# Patient Record
Sex: FEMALE | Race: WHITE
[De-identification: names, ages, dates, MRNs, and addresses within clinical notes are randomized per-mention and may not be internally consistent; named-entity substitution may affect disease eponyms.]

---

## 2017-04-26 ENCOUNTER — HOSPITAL ENCOUNTER (OUTPATIENT)
Dept: HOSPITAL 84 - D.MAMMO | Age: 72
Discharge: HOME | End: 2017-04-26
Attending: FAMILY MEDICINE
Payer: MEDICARE

## 2017-04-26 VITALS — BODY MASS INDEX: 34.1 KG/M2

## 2017-04-26 DIAGNOSIS — Z12.31: Primary | ICD-10-CM

## 2017-07-30 ENCOUNTER — HOSPITAL ENCOUNTER (EMERGENCY)
Dept: HOSPITAL 84 - D.ER | Age: 72
Discharge: HOME | End: 2017-07-30
Payer: MEDICARE

## 2017-07-30 VITALS — BODY MASS INDEX: 34.1 KG/M2

## 2017-07-30 DIAGNOSIS — N39.0: Primary | ICD-10-CM

## 2017-07-30 DIAGNOSIS — I10: ICD-10-CM

## 2017-07-30 LAB
APPEARANCE UR: (no result)
BACTERIA #/AREA URNS HPF: (no result) /HPF
COLOR UR: (no result)
PH UR STRIP: 5 [PH] (ref 5–6)
RBC #/AREA URNS HPF: (no result) /HPF (ref 0–5)
SP GR UR STRIP: 1.02 (ref 1–1.02)
SQUAMOUS #/AREA URNS HPF: (no result) /HPF (ref 0–5)

## 2017-08-18 ENCOUNTER — HOSPITAL ENCOUNTER (OUTPATIENT)
Dept: HOSPITAL 84 - D.US | Age: 72
Discharge: HOME | End: 2017-08-18
Attending: FAMILY MEDICINE
Payer: MEDICARE

## 2017-08-18 VITALS — BODY MASS INDEX: 34.1 KG/M2

## 2017-08-18 DIAGNOSIS — E04.1: Primary | ICD-10-CM

## 2017-10-03 ENCOUNTER — HOSPITAL ENCOUNTER (OUTPATIENT)
Dept: HOSPITAL 84 - D.LAB | Age: 72
End: 2017-10-03
Attending: NEUROLOGICAL SURGERY
Payer: MEDICARE

## 2017-10-03 VITALS — BODY MASS INDEX: 34.1 KG/M2

## 2017-10-03 DIAGNOSIS — Z01.812: Primary | ICD-10-CM

## 2017-10-03 LAB
BUN SERPL-MCNC: 21 MG/DL (ref 7–18)
CREAT SERPL-MCNC: 0.7 MG/DL (ref 0.6–1.3)

## 2017-10-24 ENCOUNTER — HOSPITAL ENCOUNTER (OUTPATIENT)
Dept: HOSPITAL 84 - D.MAMMO | Age: 72
Discharge: HOME | End: 2017-10-24
Attending: FAMILY MEDICINE
Payer: MEDICARE

## 2017-10-24 VITALS — BODY MASS INDEX: 34.1 KG/M2

## 2017-10-24 DIAGNOSIS — N60.02: Primary | ICD-10-CM

## 2017-10-26 ENCOUNTER — HOSPITAL ENCOUNTER (OUTPATIENT)
Dept: HOSPITAL 84 - D.LABREF | Age: 72
Discharge: HOME | End: 2017-10-26
Attending: ORTHOPAEDIC SURGERY
Payer: MEDICARE

## 2017-10-26 VITALS — BODY MASS INDEX: 34.1 KG/M2

## 2017-10-26 DIAGNOSIS — M17.0: Primary | ICD-10-CM

## 2017-10-26 LAB
BASOPHILS NFR BLD AUTO: 0.5 % (ref 0–2)
EOSINOPHIL NFR BLD: 1.9 % (ref 0–7)
ERYTHROCYTE [DISTWIDTH] IN BLOOD BY AUTOMATED COUNT: 14.9 % (ref 11.5–14.5)
ERYTHROCYTE [SEDIMENTATION RATE] IN BLOOD: 12 MM/HR (ref 0–30)
HCT VFR BLD CALC: 37.8 % (ref 36–48)
HGB BLD-MCNC: 11.9 G/DL (ref 12–16)
IMM GRANULOCYTES NFR BLD: 0.2 % (ref 0–5)
LYMPHOCYTES NFR BLD AUTO: 19.7 % (ref 15–50)
MCH RBC QN AUTO: 26.7 PG (ref 26–34)
MCHC RBC AUTO-ENTMCNC: 31.5 G/DL (ref 31–37)
MCV RBC: 84.9 FL (ref 80–100)
MONOCYTES NFR BLD: 7.2 % (ref 2–11)
NEUTROPHILS NFR BLD AUTO: 70.5 % (ref 40–80)
PLATELET # BLD: 196 10X3/UL (ref 130–400)
PMV BLD AUTO: 11.9 FL (ref 7.4–10.4)
RBC # BLD AUTO: 4.45 10X6/UL (ref 4–5.4)
WBC # BLD AUTO: 5.9 10X3/UL (ref 4.8–10.8)

## 2018-01-19 ENCOUNTER — HOSPITAL ENCOUNTER (OUTPATIENT)
Dept: HOSPITAL 84 - D.US | Age: 73
Discharge: HOME | End: 2018-01-19
Attending: FAMILY MEDICINE
Payer: MEDICARE

## 2018-01-19 VITALS — BODY MASS INDEX: 34.1 KG/M2

## 2018-01-19 DIAGNOSIS — R22.1: Primary | ICD-10-CM

## 2018-04-06 ENCOUNTER — HOSPITAL ENCOUNTER (OUTPATIENT)
Dept: HOSPITAL 84 - D.MRI | Age: 73
Discharge: HOME | End: 2018-04-06
Attending: FAMILY MEDICINE
Payer: MEDICARE

## 2018-04-06 VITALS — BODY MASS INDEX: 34.1 KG/M2
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
## 2018-04-06 DIAGNOSIS — S12.401A: Primary | ICD-10-CM
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
## 2018-05-13 ENCOUNTER — HOSPITAL ENCOUNTER (EMERGENCY)
Dept: HOSPITAL 84 - D.ER | Age: 73
Discharge: HOME | End: 2018-05-13
Payer: MEDICARE

## 2018-05-13 VITALS — BODY MASS INDEX: 34.1 KG/M2

## 2018-05-13 DIAGNOSIS — N39.0: Primary | ICD-10-CM

## 2018-05-13 LAB
APPEARANCE UR: (no result)
BACTERIA #/AREA URNS HPF: (no result) /HPF
BASOPHILS NFR BLD AUTO: 0.3 % (ref 0–2)
BILIRUB SERPL-MCNC: NEGATIVE MG/DL
COLOR UR: YELLOW
EOSINOPHIL NFR BLD: 2.9 % (ref 0–7)
ERYTHROCYTE [DISTWIDTH] IN BLOOD BY AUTOMATED COUNT: 15.2 % (ref 11.5–14.5)
GLUCOSE SERPL-MCNC: NEGATIVE MG/DL
HCT VFR BLD CALC: 30.9 % (ref 36–48)
HGB BLD-MCNC: 9.6 G/DL (ref 12–16)
IMM GRANULOCYTES NFR BLD: 0.3 % (ref 0–5)
KETONES UR STRIP-MCNC: NEGATIVE MG/DL
LYMPHOCYTES NFR BLD AUTO: 16.2 % (ref 15–50)
MCH RBC QN AUTO: 26.3 PG (ref 26–34)
MCHC RBC AUTO-ENTMCNC: 31.1 G/DL (ref 31–37)
MCV RBC: 84.7 FL (ref 80–100)
MONOCYTES NFR BLD: 6.2 % (ref 2–11)
NEUTROPHILS NFR BLD AUTO: 74.1 % (ref 40–80)
NITRITE UR-MCNC: NEGATIVE MG/ML
PH UR STRIP: 5 [PH] (ref 5–6)
PLATELET # BLD: 266 10X3/UL (ref 130–400)
PMV BLD AUTO: 10 FL (ref 7.4–10.4)
PROT UR-MCNC: (no result) MG/DL
RBC # BLD AUTO: 3.65 10X6/UL (ref 4–5.4)
RBC #/AREA URNS HPF: (no result) /HPF (ref 0–5)
SP GR UR STRIP: 1.02 (ref 1–1.02)
SQUAMOUS #/AREA URNS HPF: (no result) /HPF (ref 0–5)
UROBILINOGEN UR-MCNC: NORMAL MG/DL
WBC # BLD AUTO: 7.3 10X3/UL (ref 4.8–10.8)
WBC #/AREA URNS HPF: >50 /HPF (ref 0–5)

## 2018-10-03 ENCOUNTER — HOSPITAL ENCOUNTER (OUTPATIENT)
Dept: HOSPITAL 84 - D.US | Age: 73
Discharge: HOME | End: 2018-10-03
Attending: FAMILY MEDICINE
Payer: MEDICARE

## 2018-10-03 VITALS — BODY MASS INDEX: 34.1 KG/M2

## 2018-10-03 DIAGNOSIS — E04.1: Primary | ICD-10-CM

## 2018-11-01 ENCOUNTER — HOSPITAL ENCOUNTER (INPATIENT)
Dept: HOSPITAL 84 - D.MS | Age: 73
LOS: 3 days | Discharge: HOME | DRG: 192 | End: 2018-11-04
Attending: FAMILY MEDICINE | Admitting: FAMILY MEDICINE
Payer: MEDICARE

## 2018-11-01 VITALS — HEIGHT: 60 IN | WEIGHT: 183.38 LBS | BODY MASS INDEX: 36 KG/M2 | BODY MASS INDEX: 36 KG/M2

## 2018-11-01 VITALS — SYSTOLIC BLOOD PRESSURE: 102 MMHG | DIASTOLIC BLOOD PRESSURE: 71 MMHG

## 2018-11-01 VITALS — DIASTOLIC BLOOD PRESSURE: 98 MMHG | SYSTOLIC BLOOD PRESSURE: 153 MMHG

## 2018-11-01 DIAGNOSIS — J44.1: Primary | ICD-10-CM

## 2018-11-01 DIAGNOSIS — R53.1: ICD-10-CM

## 2018-11-01 DIAGNOSIS — I10: ICD-10-CM

## 2018-11-01 LAB
ALBUMIN SERPL-MCNC: 3.6 G/DL (ref 3.4–5)
ALP SERPL-CCNC: 59 U/L (ref 46–116)
ALT SERPL-CCNC: 19 U/L (ref 10–68)
ANION GAP SERPL CALC-SCNC: 14.7 MMOL/L (ref 8–16)
APPEARANCE UR: CLEAR
BACTERIA #/AREA URNS HPF: (no result) /HPF
BASOPHILS NFR BLD AUTO: 0.2 % (ref 0–2)
BILIRUB SERPL-MCNC: 0.19 MG/DL (ref 0.2–1.3)
BILIRUB SERPL-MCNC: NEGATIVE MG/DL
BUN SERPL-MCNC: 28 MG/DL (ref 7–18)
CALCIUM SERPL-MCNC: 9.2 MG/DL (ref 8.5–10.1)
CHLORIDE SERPL-SCNC: 104 MMOL/L (ref 98–107)
CO2 SERPL-SCNC: 27.8 MMOL/L (ref 21–32)
COLOR UR: YELLOW
CREAT SERPL-MCNC: 0.9 MG/DL (ref 0.6–1.3)
EOSINOPHIL NFR BLD: 0.2 % (ref 0–7)
ERYTHROCYTE [DISTWIDTH] IN BLOOD BY AUTOMATED COUNT: 14.5 % (ref 11.5–14.5)
GLOBULIN SER-MCNC: 3.8 G/L
GLUCOSE SERPL-MCNC: 115 MG/DL (ref 74–106)
GLUCOSE SERPL-MCNC: NEGATIVE MG/DL
HCT VFR BLD CALC: 36.2 % (ref 36–48)
HGB BLD-MCNC: 11.6 G/DL (ref 12–16)
IMM GRANULOCYTES NFR BLD: 0.2 % (ref 0–5)
KETONES UR STRIP-MCNC: NEGATIVE MG/DL
LYMPHOCYTES NFR BLD AUTO: 36.5 % (ref 15–50)
MCH RBC QN AUTO: 26.5 PG (ref 26–34)
MCHC RBC AUTO-ENTMCNC: 32 G/DL (ref 31–37)
MCV RBC: 82.6 FL (ref 80–100)
MONOCYTES NFR BLD: 7.6 % (ref 2–11)
NEUTROPHILS NFR BLD AUTO: 55.3 % (ref 40–80)
NITRITE UR-MCNC: NEGATIVE MG/ML
OSMOLALITY SERPL CALC.SUM OF ELEC: 291 MOSM/KG (ref 275–300)
PH UR STRIP: 5 [PH] (ref 5–6)
PLATELET # BLD: 173 10X3/UL (ref 130–400)
PMV BLD AUTO: 10.8 FL (ref 7.4–10.4)
POTASSIUM SERPL-SCNC: 3.5 MMOL/L (ref 3.5–5.1)
PROT SERPL-MCNC: 7.4 G/DL (ref 6.4–8.2)
PROT UR-MCNC: (no result) MG/DL
RBC # BLD AUTO: 4.38 10X6/UL (ref 4–5.4)
RBC #/AREA URNS HPF: (no result) /HPF (ref 0–5)
SODIUM SERPL-SCNC: 143 MMOL/L (ref 136–145)
SP GR UR STRIP: 1.02 (ref 1–1.02)
SQUAMOUS #/AREA URNS HPF: (no result) /HPF (ref 0–5)
UROBILINOGEN UR-MCNC: NORMAL MG/DL
WBC # BLD AUTO: 6.5 10X3/UL (ref 4.8–10.8)
WBC #/AREA URNS HPF: (no result) /HPF (ref 0–5)

## 2018-11-02 VITALS — SYSTOLIC BLOOD PRESSURE: 134 MMHG | DIASTOLIC BLOOD PRESSURE: 95 MMHG

## 2018-11-02 VITALS — DIASTOLIC BLOOD PRESSURE: 69 MMHG | SYSTOLIC BLOOD PRESSURE: 185 MMHG

## 2018-11-02 VITALS — DIASTOLIC BLOOD PRESSURE: 83 MMHG | SYSTOLIC BLOOD PRESSURE: 176 MMHG

## 2018-11-02 VITALS — DIASTOLIC BLOOD PRESSURE: 72 MMHG | SYSTOLIC BLOOD PRESSURE: 147 MMHG

## 2018-11-02 VITALS — DIASTOLIC BLOOD PRESSURE: 51 MMHG | SYSTOLIC BLOOD PRESSURE: 118 MMHG

## 2018-11-02 LAB
ALBUMIN SERPL-MCNC: 3 G/DL (ref 3.4–5)
ALP SERPL-CCNC: 50 U/L (ref 46–116)
ALT SERPL-CCNC: 16 U/L (ref 10–68)
ANION GAP SERPL CALC-SCNC: 11 MMOL/L (ref 8–16)
BASOPHILS NFR BLD AUTO: 0.4 % (ref 0–2)
BILIRUB SERPL-MCNC: 0.08 MG/DL (ref 0.2–1.3)
BUN SERPL-MCNC: 23 MG/DL (ref 7–18)
CALCIUM SERPL-MCNC: 8.8 MG/DL (ref 8.5–10.1)
CHLORIDE SERPL-SCNC: 107 MMOL/L (ref 98–107)
CO2 SERPL-SCNC: 28.8 MMOL/L (ref 21–32)
CREAT SERPL-MCNC: 0.6 MG/DL (ref 0.6–1.3)
EOSINOPHIL NFR BLD: 0.6 % (ref 0–7)
ERYTHROCYTE [DISTWIDTH] IN BLOOD BY AUTOMATED COUNT: 14.4 % (ref 11.5–14.5)
GLOBULIN SER-MCNC: 3.2 G/L
GLUCOSE SERPL-MCNC: 113 MG/DL (ref 74–106)
HCT VFR BLD CALC: 32.3 % (ref 36–48)
HGB BLD-MCNC: 10.1 G/DL (ref 12–16)
IMM GRANULOCYTES NFR BLD: 0 % (ref 0–5)
LYMPHOCYTES NFR BLD AUTO: 36.3 % (ref 15–50)
MCH RBC QN AUTO: 25.9 PG (ref 26–34)
MCHC RBC AUTO-ENTMCNC: 31.3 G/DL (ref 31–37)
MCV RBC: 82.8 FL (ref 80–100)
MONOCYTES NFR BLD: 9.8 % (ref 2–11)
NEUTROPHILS NFR BLD AUTO: 52.9 % (ref 40–80)
OSMOLALITY SERPL CALC.SUM OF ELEC: 289 MOSM/KG (ref 275–300)
PLATELET # BLD: 173 10X3/UL (ref 130–400)
PMV BLD AUTO: 11.4 FL (ref 7.4–10.4)
POTASSIUM SERPL-SCNC: 3.8 MMOL/L (ref 3.5–5.1)
PROT SERPL-MCNC: 6.2 G/DL (ref 6.4–8.2)
RBC # BLD AUTO: 3.9 10X6/UL (ref 4–5.4)
SODIUM SERPL-SCNC: 143 MMOL/L (ref 136–145)
WBC # BLD AUTO: 4.9 10X3/UL (ref 4.8–10.8)

## 2018-11-03 VITALS — SYSTOLIC BLOOD PRESSURE: 174 MMHG | DIASTOLIC BLOOD PRESSURE: 71 MMHG

## 2018-11-03 VITALS — DIASTOLIC BLOOD PRESSURE: 78 MMHG | SYSTOLIC BLOOD PRESSURE: 179 MMHG

## 2018-11-03 VITALS — DIASTOLIC BLOOD PRESSURE: 75 MMHG | SYSTOLIC BLOOD PRESSURE: 180 MMHG

## 2018-11-03 VITALS — DIASTOLIC BLOOD PRESSURE: 65 MMHG | SYSTOLIC BLOOD PRESSURE: 134 MMHG

## 2018-11-03 LAB
ALBUMIN SERPL-MCNC: 2.8 G/DL (ref 3.4–5)
ALP SERPL-CCNC: 45 U/L (ref 46–116)
ALT SERPL-CCNC: 15 U/L (ref 10–68)
ANION GAP SERPL CALC-SCNC: 11.6 MMOL/L (ref 8–16)
BASOPHILS NFR BLD AUTO: 0.2 % (ref 0–2)
BILIRUB SERPL-MCNC: 0.1 MG/DL (ref 0.2–1.3)
BUN SERPL-MCNC: 24 MG/DL (ref 7–18)
CALCIUM SERPL-MCNC: 8.6 MG/DL (ref 8.5–10.1)
CHLORIDE SERPL-SCNC: 110 MMOL/L (ref 98–107)
CO2 SERPL-SCNC: 27.6 MMOL/L (ref 21–32)
CREAT SERPL-MCNC: 0.6 MG/DL (ref 0.6–1.3)
EOSINOPHIL NFR BLD: 2.8 % (ref 0–7)
ERYTHROCYTE [DISTWIDTH] IN BLOOD BY AUTOMATED COUNT: 14.4 % (ref 11.5–14.5)
GLOBULIN SER-MCNC: 3 G/L
GLUCOSE SERPL-MCNC: 96 MG/DL (ref 74–106)
HCT VFR BLD CALC: 31.7 % (ref 36–48)
HGB BLD-MCNC: 10 G/DL (ref 12–16)
IMM GRANULOCYTES NFR BLD: 0 % (ref 0–5)
LYMPHOCYTES NFR BLD AUTO: 37 % (ref 15–50)
MCH RBC QN AUTO: 26 PG (ref 26–34)
MCHC RBC AUTO-ENTMCNC: 31.5 G/DL (ref 31–37)
MCV RBC: 82.6 FL (ref 80–100)
MONOCYTES NFR BLD: 8.5 % (ref 2–11)
NEUTROPHILS NFR BLD AUTO: 51.5 % (ref 40–80)
OSMOLALITY SERPL CALC.SUM OF ELEC: 292 MOSM/KG (ref 275–300)
PLATELET # BLD: 165 10X3/UL (ref 130–400)
PMV BLD AUTO: 11 FL (ref 7.4–10.4)
POTASSIUM SERPL-SCNC: 4.2 MMOL/L (ref 3.5–5.1)
PROT SERPL-MCNC: 5.8 G/DL (ref 6.4–8.2)
RBC # BLD AUTO: 3.84 10X6/UL (ref 4–5.4)
SODIUM SERPL-SCNC: 145 MMOL/L (ref 136–145)
WBC # BLD AUTO: 4.2 10X3/UL (ref 4.8–10.8)

## 2018-11-04 VITALS — SYSTOLIC BLOOD PRESSURE: 183 MMHG | DIASTOLIC BLOOD PRESSURE: 74 MMHG

## 2018-11-04 VITALS — DIASTOLIC BLOOD PRESSURE: 77 MMHG | SYSTOLIC BLOOD PRESSURE: 155 MMHG

## 2018-11-04 VITALS — SYSTOLIC BLOOD PRESSURE: 136 MMHG | DIASTOLIC BLOOD PRESSURE: 66 MMHG

## 2018-11-04 LAB
ALBUMIN SERPL-MCNC: 2.9 G/DL (ref 3.4–5)
ALP SERPL-CCNC: 52 U/L (ref 46–116)
ALT SERPL-CCNC: 15 U/L (ref 10–68)
ANION GAP SERPL CALC-SCNC: 13.1 MMOL/L (ref 8–16)
BASOPHILS NFR BLD AUTO: 0 % (ref 0–2)
BILIRUB SERPL-MCNC: 0.17 MG/DL (ref 0.2–1.3)
BUN SERPL-MCNC: 22 MG/DL (ref 7–18)
CALCIUM SERPL-MCNC: 9.3 MG/DL (ref 8.5–10.1)
CHLORIDE SERPL-SCNC: 105 MMOL/L (ref 98–107)
CO2 SERPL-SCNC: 28.2 MMOL/L (ref 21–32)
CREAT SERPL-MCNC: 0.6 MG/DL (ref 0.6–1.3)
EOSINOPHIL NFR BLD: 0 % (ref 0–7)
ERYTHROCYTE [DISTWIDTH] IN BLOOD BY AUTOMATED COUNT: 14.3 % (ref 11.5–14.5)
GLOBULIN SER-MCNC: 3.5 G/L
GLUCOSE SERPL-MCNC: 119 MG/DL (ref 74–106)
HCT VFR BLD CALC: 35 % (ref 36–48)
HGB BLD-MCNC: 11.1 G/DL (ref 12–16)
IMM GRANULOCYTES NFR BLD: 0.5 % (ref 0–5)
LYMPHOCYTES NFR BLD AUTO: 14.3 % (ref 15–50)
MCH RBC QN AUTO: 26.1 PG (ref 26–34)
MCHC RBC AUTO-ENTMCNC: 31.7 G/DL (ref 31–37)
MCV RBC: 82.2 FL (ref 80–100)
MONOCYTES NFR BLD: 4.8 % (ref 2–11)
NEUTROPHILS NFR BLD AUTO: 80.4 % (ref 40–80)
OSMOLALITY SERPL CALC.SUM OF ELEC: 286 MOSM/KG (ref 275–300)
PLATELET # BLD: 183 10X3/UL (ref 130–400)
PMV BLD AUTO: 11.8 FL (ref 7.4–10.4)
POTASSIUM SERPL-SCNC: 4.3 MMOL/L (ref 3.5–5.1)
PROT SERPL-MCNC: 6.4 G/DL (ref 6.4–8.2)
RBC # BLD AUTO: 4.26 10X6/UL (ref 4–5.4)
SODIUM SERPL-SCNC: 142 MMOL/L (ref 136–145)
WBC # BLD AUTO: 6.1 10X3/UL (ref 4.8–10.8)

## 2019-01-08 ENCOUNTER — HOSPITAL ENCOUNTER (OUTPATIENT)
Dept: HOSPITAL 84 - D.CT | Age: 74
Discharge: HOME | End: 2019-01-08
Attending: FAMILY MEDICINE
Payer: MEDICARE

## 2019-01-08 VITALS — BODY MASS INDEX: 35.8 KG/M2

## 2019-01-08 DIAGNOSIS — R05: Primary | ICD-10-CM

## 2019-01-17 ENCOUNTER — HOSPITAL ENCOUNTER (OUTPATIENT)
Dept: HOSPITAL 84 - D.LABREF | Age: 74
Discharge: HOME | End: 2019-01-17
Attending: INTERNAL MEDICINE
Payer: MEDICARE

## 2019-01-17 VITALS — BODY MASS INDEX: 35.8 KG/M2

## 2019-01-17 DIAGNOSIS — R91.1: Primary | ICD-10-CM

## 2019-01-21 LAB — ACE SERPL-CCNC: < 15 U/L (ref 14–82)

## 2019-02-08 ENCOUNTER — HOSPITAL ENCOUNTER (OUTPATIENT)
Dept: HOSPITAL 84 - D.MAMMO | Age: 74
Discharge: HOME | End: 2019-02-08
Attending: FAMILY MEDICINE
Payer: MEDICARE

## 2019-02-08 VITALS — BODY MASS INDEX: 35.8 KG/M2

## 2019-02-08 DIAGNOSIS — Z12.31: Primary | ICD-10-CM

## 2019-11-05 ENCOUNTER — OFFICE VISIT (OUTPATIENT)
Dept: URGENT CARE | Facility: CLINIC | Age: 74
End: 2019-11-05
Payer: MEDICARE

## 2019-11-05 VITALS
SYSTOLIC BLOOD PRESSURE: 107 MMHG | HEIGHT: 60 IN | OXYGEN SATURATION: 94 % | HEART RATE: 82 BPM | WEIGHT: 175 LBS | DIASTOLIC BLOOD PRESSURE: 69 MMHG | TEMPERATURE: 98 F | BODY MASS INDEX: 34.36 KG/M2

## 2019-11-05 DIAGNOSIS — R05.8 COUGH PRODUCTIVE OF PURULENT SPUTUM: ICD-10-CM

## 2019-11-05 DIAGNOSIS — J41.1 PURULENT BRONCHITIS: Primary | ICD-10-CM

## 2019-11-05 DIAGNOSIS — R05.8 COUGH PRESENT FOR GREATER THAN 3 WEEKS: ICD-10-CM

## 2019-11-05 DIAGNOSIS — R50.9 FEVER, UNSPECIFIED FEVER CAUSE: ICD-10-CM

## 2019-11-05 LAB
GLUCOSE SERPL-MCNC: 105 MG/DL (ref 70–110)
POC ANION GAP: 14 MMOL/L (ref 10–20)
POC BUN: 24 MMOL/L (ref 8–26)
POC CHLORIDE: 106 MMOL/L (ref 98–109)
POC CREATININE: 0.8 MG/DL (ref 0.6–1.3)
POC HEMATOCRIT: 34 %PCV (ref 37–47)
POC HEMOGLOBIN: 11.6 G/DL (ref 12.5–16)
POC ICA: 1.15 MMOL/L (ref 1.12–1.32)
POC POTASSIUM: 4.2 MMOL/L (ref 3.5–4.9)
POC SODIUM: 143 MMOL/L (ref 138–146)
POC TCO2: 27 MMOL/L (ref 24–29)

## 2019-11-05 PROCEDURE — 80047 POCT CHEMISTRY PANEL: ICD-10-PCS | Mod: QW,S$GLB,, | Performed by: PHYSICIAN ASSISTANT

## 2019-11-05 PROCEDURE — 71046 X-RAY EXAM CHEST 2 VIEWS: CPT | Mod: S$GLB,,, | Performed by: RADIOLOGY

## 2019-11-05 PROCEDURE — 80047 BASIC METABLC PNL IONIZED CA: CPT | Mod: QW,S$GLB,, | Performed by: PHYSICIAN ASSISTANT

## 2019-11-05 PROCEDURE — 99214 PR OFFICE/OUTPT VISIT, EST, LEVL IV, 30-39 MIN: ICD-10-PCS | Mod: S$GLB,,, | Performed by: PHYSICIAN ASSISTANT

## 2019-11-05 PROCEDURE — 71046 XR CHEST PA AND LATERAL: ICD-10-PCS | Mod: S$GLB,,, | Performed by: RADIOLOGY

## 2019-11-05 PROCEDURE — 99214 OFFICE O/P EST MOD 30 MIN: CPT | Mod: S$GLB,,, | Performed by: PHYSICIAN ASSISTANT

## 2019-11-05 RX ORDER — HYDROCODONE BITARTRATE AND ACETAMINOPHEN 10; 325 MG/1; MG/1
1 TABLET ORAL DAILY
COMMUNITY

## 2019-11-05 RX ORDER — SERTRALINE HYDROCHLORIDE 100 MG/1
100 TABLET, FILM COATED ORAL DAILY
COMMUNITY

## 2019-11-05 RX ORDER — GABAPENTIN 100 MG/1
100 CAPSULE ORAL 4 TIMES DAILY
COMMUNITY

## 2019-11-05 RX ORDER — LEVOFLOXACIN 750 MG/1
750 TABLET ORAL DAILY
Qty: 7 TABLET | Refills: 0 | Status: SHIPPED | OUTPATIENT
Start: 2019-11-05 | End: 2019-11-12

## 2019-11-05 RX ORDER — LISINOPRIL 10 MG/1
10 TABLET ORAL DAILY
COMMUNITY

## 2019-11-05 RX ORDER — MONTELUKAST SODIUM 10 MG/1
10 TABLET ORAL DAILY
COMMUNITY

## 2019-11-05 RX ORDER — ARIPIPRAZOLE 2 MG/1
5 TABLET ORAL DAILY
COMMUNITY

## 2019-11-05 RX ORDER — OXYBUTYNIN CHLORIDE 5 MG/1
5 TABLET ORAL 2 TIMES DAILY
COMMUNITY

## 2019-11-05 RX ORDER — DOXAZOSIN 4 MG/1
4 TABLET ORAL 2 TIMES DAILY
COMMUNITY

## 2019-11-05 NOTE — PROGRESS NOTES
Subjective:       Patient ID: Anam Baum is a 74 y.o. female.    Vitals:  height is 5' (1.524 m) and weight is 79.4 kg (175 lb). Her temperature is 98.1 °F (36.7 °C). Her blood pressure is 107/69 and her pulse is 82. Her oxygen saturation is 94% (abnormal).     Chief Complaint: Cough    Pt states that she was diagnosed with bronchitis two weeks ago with her PCP and was treated with antibiotics (1G rocephin injection and omnicef). Pt reports having body aches, fever, and now productive cough. Denies CP, lung pain, SOB, wheezing, headaches. She does report a history of pneumonia and COPD. Reports taking tylenol for fever shortly before arrival.     Cough   This is a new problem. The current episode started 1 to 4 weeks ago. The problem has been gradually worsening. The problem occurs constantly. The cough is non-productive. Associated symptoms include chills and a fever. Pertinent negatives include no ear pain, eye redness, hemoptysis, myalgias, rash, sore throat, shortness of breath or wheezing. Treatments tried: abx. The treatment provided no relief. Her past medical history is significant for bronchitis.       Constitution: Positive for chills and fever. Negative for sweating and fatigue.   HENT: Negative for ear pain, congestion, sinus pain, sinus pressure, sore throat and voice change.    Neck: Negative for painful lymph nodes.   Eyes: Negative for eye redness.   Respiratory: Positive for cough. Negative for chest tightness, sputum production, bloody sputum, COPD, shortness of breath, stridor, wheezing and asthma.    Gastrointestinal: Negative for nausea and vomiting.   Musculoskeletal: Negative for muscle ache.   Skin: Negative for rash.   Allergic/Immunologic: Negative for seasonal allergies and asthma.   Hematologic/Lymphatic: Negative for swollen lymph nodes.       Objective:      Physical Exam   Constitutional: She is oriented to person, place, and time. She appears well-developed and well-nourished. She is  cooperative.  Non-toxic appearance. She does not have a sickly appearance. No distress.   Patient is sitting on exam table in no acute distress. Nontoxic appearing.  in room.   HENT:   Head: Normocephalic and atraumatic.   Right Ear: Hearing, tympanic membrane, external ear and ear canal normal.   Left Ear: Hearing, tympanic membrane, external ear and ear canal normal.   Nose: Nose normal. No mucosal edema, rhinorrhea or nasal deformity. No epistaxis. Right sinus exhibits no maxillary sinus tenderness and no frontal sinus tenderness. Left sinus exhibits no maxillary sinus tenderness and no frontal sinus tenderness.   Mouth/Throat: Uvula is midline and mucous membranes are normal. No trismus in the jaw. Normal dentition. No uvula swelling. Posterior oropharyngeal erythema and cobblestoning present. No oropharyngeal exudate or posterior oropharyngeal edema.   Eyes: Conjunctivae and lids are normal. No scleral icterus.   Neck: Trachea normal, full passive range of motion without pain and phonation normal. Neck supple. No neck rigidity. No edema and no erythema present.   Cardiovascular: Normal rate, regular rhythm, normal heart sounds, intact distal pulses and normal pulses.   Pulmonary/Chest: Effort normal and breath sounds normal. No respiratory distress. She has no decreased breath sounds. She has no wheezes. She has no rhonchi. She has no rales.   Abdominal: Normal appearance.   Musculoskeletal: Normal range of motion. She exhibits no edema or deformity.   Lymphadenopathy:     She has no cervical adenopathy.   Neurological: She is alert and oriented to person, place, and time. She exhibits normal muscle tone. Coordination normal.   Skin: Skin is warm, dry, intact, not diaphoretic and not pale.   Psychiatric: She has a normal mood and affect. Her speech is normal and behavior is normal. Judgment and thought content normal. Cognition and memory are normal.   Nursing note and vitals reviewed.        X-ray Chest  Pa And Lateral  Result Date: 11/5/2019  EXAMINATION: XR CHEST PA AND LATERAL CLINICAL HISTORY: COUGH; Cough TECHNIQUE: PA and lateral views of the chest were performed. COMPARISON: No priors. FINDINGS: Nonspecific mild elevation of the right hemidiaphragm.  Bibasilar few scattered linear opacities consistent with subsegmental scarring versus atelectasis.  There is a 5-6 mm somewhat oval-shaped radiodensity projected at the medial left lung base on the frontal view and potentially anteriorly on the lateral view which could represent a vessel on end or pulmonary nodule.  The lungs are otherwise well expanded without large consolidation, pleural effusion or pneumothorax. The cardiac silhouette is normal in size. The hilar and mediastinal contours are unremarkable.  Calcific atherosclerosis and slight tortuosity of the thoracic aorta. Degenerative changes of the shoulders partially included.  Included abdominal structures unremarkable.   No radiographic acute intrathoracic process seen.  Specifically, no focal consolidation. Left basilar 5-6 mm oval shaped radiodensity which remains indeterminate.  Further evaluation with elective/nonemergent CT thorax can be obtained, if 2 year stability cannot be confirmed. This report was flagged in Epic as containing an incidental finding. Electronically signed by resident: Александр Hernández Date:    11/05/2019 Time:    15:07 Electronically signed by: Richard Guo MD Date:    11/05/2019 Time: 15:28    Results for orders placed or performed in visit on 11/05/19   POCT Chemistry Panel   Result Value Ref Range    POC Sodium 143 138 - 146 MMOL/L    POC Potassium 4.2 3.5 - 4.9 MMOL/L    POC Chloride 106 98 - 109 MMOL/L    POC BUN 24 8 - 26 MMOL/L    POC Glucose 105 70 - 110 MG/DL    POC Creatinine 0.8 0.6 - 1.3 mg/dL    POC iCA 1.15 1.12 - 1.32 MMOL/L    POC TCO2 27 24 - 29 MMOL/L    POC Hematocrit 34 (A) 37 - 47 %PCV    POC Hemoglobin 11.6 (A) 12.5 - 16 g/dL    POC Anion Gap 14 10.0 - 20  MMOL/L     Pt O2 sat 94%. History of COPD[place] Unsure of baseline O2 sat. Patient denies SOB. No wheezing or extra breath sounds noted on exam. Pt denies being on home oxygen. Will refrain from steroids at this time. Reports she does neb treatments every 6 hours at home, with good relief.   CURB65 score of 1: outpatient therapy recommended.   Based on patient worsening of symptoms, new onset fever, age, and history of pneumonia and COPD, will change antibiotic from omnicef to Levaquin. Advised follow up with PCP in the next 7 days. Advised on STRICT ER precautions for new or worsening symptoms. Gave all RED FLAG warning signs. Answered all patient questions. Patient verbalized understanding and voiced agreement with current treatment plan.    Assessment:       1. Purulent bronchitis    2. Cough productive of purulent sputum    3. Cough present for greater than 3 weeks    4. Fever, unspecified fever cause        Plan:         Purulent bronchitis  -     levoFLOXacin (LEVAQUIN) 750 MG tablet; Take 1 tablet (750 mg total) by mouth once daily. for 7 days  Dispense: 7 tablet; Refill: 0    Cough productive of purulent sputum  -     X-Ray Chest PA And Lateral; Future; Expected date: 11/05/2019    Cough present for greater than 3 weeks  -     X-Ray Chest PA And Lateral; Future; Expected date: 11/05/2019    Fever, unspecified fever cause  -     X-Ray Chest PA And Lateral; Future; Expected date: 11/05/2019  -     POCT Chemistry Panel      Patient Instructions     Follow up with your PCP as we have discussed.    If your condition worsens or fails to improve we recommend that you receive another evaluation at the ER immediately or contact your PCP to discuss your concerns or return here. You must understand that you've received an urgent care treatment only and that you may be released before all your medical problems are known or treated. You the patient will arrange for followup care as instructed.    Rest  and fluids are important  Can use honey with jose to soothe your throat    Take full course of antibiotics as directed.    Continue nebulizer treatments as needed for wheezing.    -  Flonase (fluticasone) is a nasal spray which is available over the counter and may help with symptoms of nasal congestion.     -  Tylenol or ibuprofen can also be used as directed for pain unless you have an allergy to them or medical condition such as stomach ulcers, kidney or liver disease or blood thinners etc for which you should not be taking these type of medications.     Please follow up with your primary care doctor or specialist in the next 48-72hrs as needed and if no improvement.    If you  smoke, please stop smoking.        Bronchitis, Antibiotic Treatment (Adult)    Bronchitis is an infection of the air passages (bronchial tubes) in your lungs. It often occurs when you have a cold. This illness is contagious during the first few days and is spread through the air by coughing and sneezing, or by direct contact (touching the sick person and then touching your own eyes, nose, or mouth).  Symptoms of bronchitis include cough with mucus (phlegm) and low-grade fever. Bronchitis usually lasts 7 to 14 days. Mild cases can be treated with simple home remedies. More severe infection is treated with an antibiotic.  Home care  Follow these guidelines when caring for yourself at home:  · If your symptoms are severe, rest at home for the first 2 to 3 days. When you go back to your usual activities, don't let yourself get too tired.  · Do not smoke. Also avoid being exposed to secondhand smoke.  · You may use over-the-counter medicines to control fever or pain, unless another medicine was prescribed. (Note: If you have chronic liver or kidney disease or have ever had a stomach ulcer or gastrointestinal bleeding, talk with your healthcare provider before using these medicines. Also talk to your provider if you are taking medicine to  prevent blood clots.) Aspirin should never be given to anyone younger than 18 years of age who is ill with a viral infection or fever. It may cause severe liver or brain damage.  · Your appetite may be poor, so a light diet is fine. Avoid dehydration by drinking 6 to 8 glasses of fluids per day (such as water, soft drinks, sports drinks, juices, tea, or soup). Extra fluids will help loosen secretions in the nose and lungs.  · Over-the-counter cough, cold, and sore-throat medicines will not shorten the length of the illness, but they may be helpful to reduce symptoms. (Note: Do not use decongestants if you have high blood pressure.)  · Finish all antibiotic medicine. Do this even if you are feeling better after only a few days.  Follow-up care  Follow up with your healthcare provider, or as advised. If you had an X-ray or ECG (electrocardiogram), a specialist will review it. You will be notified of any new findings that may affect your care.  Note: If you are age 65 or older, or if you have a chronic lung disease or condition that affects your immune system, or you smoke, talk to your healthcare provider about having pneumococcal vaccinations and a yearly influenza vaccination (flu shot).  When to seek medical advice  Call your healthcare provider right away if any of these occur:  · Fever of 100.4°F (38°C) or higher  · Coughing up increased amounts of colored sputum  · Weakness, drowsiness, headache, facial pain, ear pain, or a stiff neck  Call 911, or get immediate medical care  Contact emergency services right away if any of these occur.  · Coughing up blood  · Worsening weakness, drowsiness, headache, or stiff neck  · Trouble breathing, wheezing, or pain with breathing  Date Last Reviewed: 9/13/2015  © 9663-5879 [employer]. [address] [place] [address], [place] [address]. All rights reserved. This information is not intended as a substitute for professional medical care. Always follow your healthcare  professional's instructions.

## 2019-11-05 NOTE — PATIENT INSTRUCTIONS
Follow up with your PCP as we have discussed.    If your condition worsens or fails to improve we recommend that you receive another evaluation at the ER immediately or contact your PCP to discuss your concerns or return here. You must understand that you've received an urgent care treatment only and that you may be released before all your medical problems are known or treated. You the patient will arrange for followup care as instructed.    Rest and fluids are important  Can use honey with jose to soothe your throat    Take full course of antibiotics as directed.    Continue nebulizer treatments as needed for wheezing.    -  Flonase (fluticasone) is a nasal spray which is available over the counter and may help with symptoms of nasal congestion.     -  Tylenol or ibuprofen can also be used as directed for pain unless you have an allergy to them or medical condition such as stomach ulcers, kidney or liver disease or blood thinners etc for which you should not be taking these type of medications.     Please follow up with your primary care doctor or specialist in the next 48-72hrs as needed and if no improvement.    If you  smoke, please stop smoking.        Bronchitis, Antibiotic Treatment (Adult)    Bronchitis is an infection of the air passages (bronchial tubes) in your lungs. It often occurs when you have a cold. This illness is contagious during the first few days and is spread through the air by coughing and sneezing, or by direct contact (touching the sick person and then touching your own eyes, nose, or mouth).  Symptoms of bronchitis include cough with mucus (phlegm) and low-grade fever. Bronchitis usually lasts 7 to 14 days. Mild cases can be treated with simple home remedies. More severe infection is treated with an antibiotic.  Home care  Follow these guidelines when caring for yourself at home:  · If your symptoms are severe, rest at home for the first 2 to 3 days. When you go back to your usual  activities, don't let yourself get too tired.  · Do not smoke. Also avoid being exposed to secondhand smoke.  · You may use over-the-counter medicines to control fever or pain, unless another medicine was prescribed. (Note: If you have chronic liver or kidney disease or have ever had a stomach ulcer or gastrointestinal bleeding, talk with your healthcare provider before using these medicines. Also talk to your provider if you are taking medicine to prevent blood clots.) Aspirin should never be given to anyone younger than 18 years of age who is ill with a viral infection or fever. It may cause severe liver or brain damage.  · Your appetite may be poor, so a light diet is fine. Avoid dehydration by drinking 6 to 8 glasses of fluids per day (such as water, soft drinks, sports drinks, juices, tea, or soup). Extra fluids will help loosen secretions in the nose and lungs.  · Over-the-counter cough, cold, and sore-throat medicines will not shorten the length of the illness, but they may be helpful to reduce symptoms. (Note: Do not use decongestants if you have high blood pressure.)  · Finish all antibiotic medicine. Do this even if you are feeling better after only a few days.  Follow-up care  Follow up with your healthcare provider, or as advised. If you had an X-ray or ECG (electrocardiogram), a specialist will review it. You will be notified of any new findings that may affect your care.  Note: If you are age 65 or older, or if you have a chronic lung disease or condition that affects your immune system, or you smoke, talk to your healthcare provider about having pneumococcal vaccinations and a yearly influenza vaccination (flu shot).  When to seek medical advice  Call your healthcare provider right away if any of these occur:  · Fever of 100.4°F (38°C) or higher  · Coughing up increased amounts of colored sputum  · Weakness, drowsiness, headache, facial pain, ear pain, or a stiff neck  Call 911, or get immediate  medical care  Contact emergency services right away if any of these occur.  · Coughing up blood  · Worsening weakness, drowsiness, headache, or stiff neck  · Trouble breathing, wheezing, or pain with breathing  Date Last Reviewed: 9/13/2015  © 6819-0008 [employer]. [address] [place] [address]. All rights reserved. This information is not intended as a substitute for professional medical care. Always follow your healthcare professional's instructions.

## 2022-11-29 ENCOUNTER — OFFICE VISIT (OUTPATIENT)
Dept: URGENT CARE | Facility: CLINIC | Age: 77
End: 2022-11-29
Payer: MEDICARE

## 2022-11-29 VITALS
TEMPERATURE: 99 F | WEIGHT: 175 LBS | BODY MASS INDEX: 34.36 KG/M2 | HEIGHT: 60 IN | OXYGEN SATURATION: 98 % | HEART RATE: 63 BPM | DIASTOLIC BLOOD PRESSURE: 73 MMHG | SYSTOLIC BLOOD PRESSURE: 135 MMHG | RESPIRATION RATE: 18 BRPM

## 2022-11-29 DIAGNOSIS — B96.89 BACTERIAL UPPER RESPIRATORY INFECTION: Primary | ICD-10-CM

## 2022-11-29 DIAGNOSIS — J06.9 BACTERIAL UPPER RESPIRATORY INFECTION: Primary | ICD-10-CM

## 2022-11-29 LAB
CTP QC/QA: YES
CTP QC/QA: YES
POC MOLECULAR INFLUENZA A AGN: NEGATIVE
POC MOLECULAR INFLUENZA B AGN: NEGATIVE
SARS-COV-2 AG RESP QL IA.RAPID: NEGATIVE

## 2022-11-29 PROCEDURE — 99213 OFFICE O/P EST LOW 20 MIN: CPT | Mod: S$GLB,,, | Performed by: NURSE PRACTITIONER

## 2022-11-29 PROCEDURE — 87502 POCT INFLUENZA A/B MOLECULAR: ICD-10-PCS | Mod: QW,S$GLB,, | Performed by: NURSE PRACTITIONER

## 2022-11-29 PROCEDURE — 87502 INFLUENZA DNA AMP PROBE: CPT | Mod: QW,S$GLB,, | Performed by: NURSE PRACTITIONER

## 2022-11-29 PROCEDURE — 99213 PR OFFICE/OUTPT VISIT, EST, LEVL III, 20-29 MIN: ICD-10-PCS | Mod: S$GLB,,, | Performed by: NURSE PRACTITIONER

## 2022-11-29 PROCEDURE — 87811 SARS-COV-2 COVID19 W/OPTIC: CPT | Mod: QW,CR,S$GLB, | Performed by: NURSE PRACTITIONER

## 2022-11-29 PROCEDURE — 87811 SARS CORONAVIRUS 2 ANTIGEN POCT, MANUAL READ: ICD-10-PCS | Mod: QW,CR,S$GLB, | Performed by: NURSE PRACTITIONER

## 2022-11-29 RX ORDER — FLUTICASONE PROPIONATE 50 MCG
1 SPRAY, SUSPENSION (ML) NASAL 2 TIMES DAILY
Qty: 9.9 ML | Refills: 0 | Status: SHIPPED | OUTPATIENT
Start: 2022-11-29

## 2022-11-29 RX ORDER — DOXYCYCLINE 100 MG/1
100 CAPSULE ORAL EVERY 12 HOURS
Qty: 14 CAPSULE | Refills: 0 | Status: SHIPPED | OUTPATIENT
Start: 2022-11-29 | End: 2022-12-06

## 2022-11-29 RX ORDER — BENZONATATE 200 MG/1
200 CAPSULE ORAL 3 TIMES DAILY PRN
Qty: 30 CAPSULE | Refills: 0 | Status: SHIPPED | OUTPATIENT
Start: 2022-11-29

## 2022-11-29 RX ORDER — AZELASTINE 1 MG/ML
1 SPRAY, METERED NASAL 2 TIMES DAILY
Qty: 30 ML | Refills: 0 | Status: SHIPPED | OUTPATIENT
Start: 2022-11-29 | End: 2022-12-09

## 2022-11-29 NOTE — PROGRESS NOTES
Subjective:       Patient ID: Anam Baum is a 77 y.o. female.    Vitals:  height is 5' (1.524 m) and weight is 79.4 kg (175 lb). Her oral temperature is 98.6 °F (37 °C). Her blood pressure is 135/73 and her pulse is 63. Her respiration is 18 and oxygen saturation is 98%.     Chief Complaint: URI    77-year-old female presents to clinic for evaluation of nasal congestion, cough, body aches x1 week.  Patient denies any known exposures, however she presents with her sister who has been evaluated for similar symptoms.  She reports taking Mucinex and Benadryl.  She denies chest pain or shortness of breath.  She is awake and alert, answers questions appropriately, no acute distress noted on today's visit.    Past Medical History:  No date: Hypertension      URI   This is a new problem. The current episode started in the past 7 days. The problem has been unchanged. There has been no fever. Associated symptoms include congestion and coughing. Pertinent negatives include no abdominal pain, chest pain, nausea or vomiting. She has tried decongestant for the symptoms. The treatment provided mild relief.     Constitution: Negative for activity change, appetite change, chills, sweating, fatigue and fever.   HENT:  Positive for congestion.    Cardiovascular:  Negative for chest pain.   Respiratory:  Positive for cough. Negative for shortness of breath.    Gastrointestinal:  Negative for abdominal pain, nausea and vomiting.   Neurological:  Negative for dizziness.     Objective:      Physical Exam   Constitutional: She is oriented to person, place, and time. She appears well-developed.  Non-toxic appearance. She does not appear ill. No distress.   HENT:   Head: Normocephalic and atraumatic. Head is without abrasion, without contusion and without laceration.   Ears:   Right Ear: External ear normal.   Left Ear: External ear normal.   Nose: Congestion present.   Mouth/Throat: Mucous membranes are normal. Posterior oropharyngeal  erythema present.   Eyes: Conjunctivae, EOM and lids are normal. Right eye exhibits no discharge. Left eye exhibits no discharge.   Neck: Trachea normal and phonation normal.   Cardiovascular: Normal rate and normal heart sounds.   Pulmonary/Chest: Effort normal and breath sounds normal. No stridor. No respiratory distress. She has no wheezes.   Abdominal: Normal appearance.   Musculoskeletal: Normal range of motion.         General: Normal range of motion.   Neurological: She is alert and oriented to person, place, and time.   Skin: Skin is warm, dry, intact, not diaphoretic and not pale. No abrasion, No burn and No ecchymosis   Psychiatric: Her speech is normal and behavior is normal. Mood, judgment and thought content normal.   Nursing note and vitals reviewed.      Results for orders placed or performed in visit on 11/29/22   POCT Influenza A/B MOLECULAR   Result Value Ref Range    POC Molecular Influenza A Ag Negative Negative, Not Reported    POC Molecular Influenza B Ag Negative Negative, Not Reported     Acceptable Yes    SARS Coronavirus 2 Antigen, POCT Manual Read   Result Value Ref Range    SARS Coronavirus 2 Antigen Negative Negative     Acceptable Yes        Assessment:       1. Bacterial upper respiratory infection            Plan:         Bacterial upper respiratory infection  -     POCT Influenza A/B MOLECULAR  -     SARS Coronavirus 2 Antigen, POCT Manual Read  -     doxycycline (VIBRAMYCIN) 100 MG Cap; Take 1 capsule (100 mg total) by mouth every 12 (twelve) hours. for 7 days  Dispense: 14 capsule; Refill: 0  -     fluticasone propionate (FLONASE) 50 mcg/actuation nasal spray; 1 spray (50 mcg total) by Each Nostril route 2 (two) times daily.  Dispense: 9.9 mL; Refill: 0  -     azelastine (ASTELIN) 137 mcg (0.1 %) nasal spray; 1 spray (137 mcg total) by Nasal route 2 (two) times daily. for 10 days  Dispense: 30 mL; Refill: 0  -     benzonatate (TESSALON) 200 MG capsule;  Take 1 capsule (200 mg total) by mouth 3 (three) times daily as needed for Cough.  Dispense: 30 capsule; Refill: 0       - negative COVID, negative influenza on today's visit.  Given current symptoms, and length of symptoms, will treat for acute bacterial infection.  Complete antibiotics as directed.  Continue symptomatic care.  Follow-up with PCP.  ER precautions given.  Patient verbalized understanding and is in agreement with plan.    Patient Instructions   - You must understand that you have received an Urgent Care treatment only and that you may be released before all of your medical problems are known or treated.   - You, the patient, will arrange for follow up care as instructed.   - If your condition worsens or fails to improve we recommend that you receive another evaluation at the ER immediately or contact your PCP to discuss your concerns or return here.